# Patient Record
Sex: FEMALE | Race: WHITE | NOT HISPANIC OR LATINO | Employment: OTHER | URBAN - METROPOLITAN AREA
[De-identification: names, ages, dates, MRNs, and addresses within clinical notes are randomized per-mention and may not be internally consistent; named-entity substitution may affect disease eponyms.]

---

## 2022-09-12 PROBLEM — R06.02 SHORTNESS OF BREATH: Status: ACTIVE | Noted: 2022-09-12

## 2023-01-17 ENCOUNTER — TELEPHONE (OUTPATIENT)
Dept: PULMONOLOGY | Facility: CLINIC | Age: 68
End: 2023-01-17

## 2023-01-17 RX ORDER — HYDROCHLOROTHIAZIDE 25 MG/1
25 TABLET ORAL DAILY
Qty: 30 TABLET | Refills: 2 | Status: SHIPPED | OUTPATIENT
Start: 2023-01-17 | End: 2023-04-21

## 2023-01-18 NOTE — TELEPHONE ENCOUNTER
Patient continues with shortness of breath with heat and humidity exposure despite Breo use, still with chronic cough, discontinue enalapril-HCTZ and add HCTZ 25 mg daily, will plan for office follow-up prior to July 2023.

## 2023-01-18 NOTE — TELEPHONE ENCOUNTER
----- Message from Kayy Shane MA sent at 1/17/2023  3:08 PM CST -----    ----- Message -----  From: Mary Aliec Leal  Sent: 1/17/2023   2:40 PM CST  To: [facility] Staff    Pt phoned office regarding office visit on 10/26/2022- pt was inquiring about the specific dx that Dr. Jason diagnosed her with and the date she was dx she says her attorneys need this information- I see what she presented to the office for pt states she didn't know if Dr. Jason was waiting to see her back for her f/u visit and for her to repeat ct before he officially dx her or what. Pt is scheduled 3/14/23 at 9am w/ Dr. Jason. She can be reached at [phone].        Thanks  Mary Alice